# Patient Record
Sex: FEMALE | Race: WHITE | NOT HISPANIC OR LATINO | Employment: OTHER | URBAN - METROPOLITAN AREA
[De-identification: names, ages, dates, MRNs, and addresses within clinical notes are randomized per-mention and may not be internally consistent; named-entity substitution may affect disease eponyms.]

---

## 2018-12-23 ENCOUNTER — HOSPITAL ENCOUNTER (EMERGENCY)
Facility: HOSPITAL | Age: 23
Discharge: HOME | End: 2018-12-23
Attending: EMERGENCY MEDICINE
Payer: MEDICAID

## 2018-12-23 VITALS
SYSTOLIC BLOOD PRESSURE: 121 MMHG | TEMPERATURE: 98 F | OXYGEN SATURATION: 97 % | RESPIRATION RATE: 16 BRPM | HEIGHT: 66 IN | WEIGHT: 186 LBS | DIASTOLIC BLOOD PRESSURE: 64 MMHG | BODY MASS INDEX: 29.89 KG/M2 | HEART RATE: 80 BPM

## 2018-12-23 DIAGNOSIS — J06.9 VIRAL URI: Primary | ICD-10-CM

## 2018-12-23 PROCEDURE — 99281 EMR DPT VST MAYX REQ PHY/QHP: CPT

## 2018-12-23 NOTE — ED PROVIDER NOTES
"Encounter Date: 12/23/2018    SCRIBE #1 NOTE: Ariella EM am scribing for, and in the presence of, JACOB Franks.       History     Chief Complaint   Patient presents with    Cough    Nasal Congestion       Time seen by provider: 10:05 AM on 12/23/2018    Bowen Edwards is a 23 y.o. female with no pertinent PMHx or SHx on file who presents to the ED with complaints of a runny nose that started "a couple days ago". Patient denies cough, sore throat, ear pain, sinus pain, body aches, N/V/D, abdominal pain, fever, and chills. She reports her son is sick with similar symptoms. The patient has no other medical concerns or complaints at this moment. She denies onset of any other new symptoms currently. Penicillin allergy noted.       The history is provided by the patient.     Review of patient's allergies indicates:   Allergen Reactions    Pcn [penicillins]      History reviewed. No pertinent past medical history.  History reviewed. No pertinent surgical history.  History reviewed. No pertinent family history.  Social History     Tobacco Use    Smoking status: Never Smoker   Substance Use Topics    Alcohol use: Not on file    Drug use: Not on file     Review of Systems   Constitutional: Negative for activity change, chills and fever.   HENT: Positive for rhinorrhea. Negative for congestion, ear pain and sore throat.    Respiratory: Negative for cough and wheezing.    Cardiovascular: Negative for chest pain.   Gastrointestinal: Negative for diarrhea, nausea and vomiting.   Musculoskeletal: Negative for myalgias and neck pain.   Skin: Negative for rash.   Neurological: Negative for syncope and headaches.   Hematological: Negative for adenopathy.   Psychiatric/Behavioral: The patient is not nervous/anxious.        Physical Exam     Initial Vitals [12/23/18 0853]   BP Pulse Resp Temp SpO2   121/64 80 16 98.3 °F (36.8 °C) 97 %      MAP       --         Physical Exam    Nursing note and vitals " reviewed.  Constitutional: She appears well-developed and well-nourished. She is not diaphoretic.  Non-toxic appearance. She does not have a sickly appearance. She does not appear ill. No distress.   HENT:   Head: Normocephalic and atraumatic.   Right Ear: Hearing, tympanic membrane, external ear and ear canal normal.   Left Ear: Hearing, tympanic membrane, external ear and ear canal normal.   Nose: Nose normal. No rhinorrhea. Right sinus exhibits no frontal sinus tenderness. Left sinus exhibits no frontal sinus tenderness.   Mouth/Throat: Uvula is midline, oropharynx is clear and moist and mucous membranes are normal. No oropharyngeal exudate.   Eyes: Conjunctivae and EOM are normal. Right eye exhibits no discharge. Left eye exhibits no discharge.   Neck: Normal range of motion. Neck supple.   Cardiovascular: Normal rate, regular rhythm and normal heart sounds. Exam reveals no gallop and no friction rub.    No murmur heard.  Pulmonary/Chest: Breath sounds normal. No respiratory distress. She has no wheezes. She has no rhonchi. She has no rales.   Abdominal: Soft. Bowel sounds are normal. There is no tenderness.   Musculoskeletal: Normal range of motion.   Lymphadenopathy:     She has no cervical adenopathy.   Neurological: She is alert and oriented to person, place, and time. GCS score is 15. GCS eye subscore is 4. GCS verbal subscore is 5. GCS motor subscore is 6.   Skin: Skin is warm and dry. No rash noted.   Psychiatric: She has a normal mood and affect. Her behavior is normal. Judgment and thought content normal.         ED Course   Procedures  Labs Reviewed - No data to display       Imaging Results    None          Medical Decision Making:   History:   Old Medical Records: I decided to obtain old medical records.  Differential Diagnosis:   Influenza  Pneumonia  Strep pharyngitis  Meningitis  Viral syndrome       APC / Resident Notes:   23 year old well appearing, otherwise healthy female presents with c/o  runny nose. The patient appears to have a viral upper respiratory infection.  Based upon the history and physical exam the patient does not appear to have a serious bacterial infection such as pneumonia, sepsis, otitis media, bacterial sinusitis, strep pharyngitis, parapharyngeal or peritonsillar abscess, meningitis.  Patient appears very well and I have given specific return precautions to the patient.  The patient can take over the counter medications and does not appear to need antibiotics at this time. I have discussed pt with Dr Vargas who agrees with POC. Pt voices understanding and is agreeable to the plan.  She is given specific return precautions.        Scribe Attestation:   Scribe #1: I performed the above scribed service and the documentation accurately describes the services I performed. I attest to the accuracy of the note.      I, CAROLINE Franks, personally performed the services described in this documentation. All medical record entries made by the scribe were at my direction and in my presence.  I have reviewed the chart and agree that the record reflects my personal performance and is accurate and complete. CAROLINE Franks.  11:34 AM 12/23/2018             Clinical Impression:   The encounter diagnosis was Viral URI.      Disposition:   Disposition: Discharged  Condition: Stable                        Jaye Damon NP  12/23/18 6115

## 2019-07-20 ENCOUNTER — HOSPITAL ENCOUNTER (EMERGENCY)
Facility: HOSPITAL | Age: 24
Discharge: HOME | End: 2019-07-20
Attending: EMERGENCY MEDICINE
Payer: MEDICAID

## 2019-07-20 VITALS
DIASTOLIC BLOOD PRESSURE: 62 MMHG | HEART RATE: 91 BPM | HEIGHT: 66 IN | WEIGHT: 168.88 LBS | OXYGEN SATURATION: 100 % | RESPIRATION RATE: 20 BRPM | BODY MASS INDEX: 27.14 KG/M2 | SYSTOLIC BLOOD PRESSURE: 116 MMHG | TEMPERATURE: 98 F

## 2019-07-20 DIAGNOSIS — Z32.01 POSITIVE URINE PREGNANCY TEST: Primary | ICD-10-CM

## 2019-07-20 LAB
B-HCG UR QL: POSITIVE
CTP QC/QA: YES

## 2019-07-20 PROCEDURE — 81025 URINE PREGNANCY TEST: CPT | Performed by: NURSE PRACTITIONER

## 2019-07-20 PROCEDURE — 99282 EMERGENCY DEPT VISIT SF MDM: CPT | Mod: 25

## 2019-07-20 NOTE — ED PROVIDER NOTES
Encounter Date: 2019    SCRIBE #1 NOTE: Ariella EM am scribing for, and in the presence of, CAROLINE Franks.       History     Chief Complaint   Patient presents with    Possible Pregnancy     reports positive test last tuesday. wants to be referred to an OBGYN       Time seen by provider: 12:26 PM on 2019    Bowen Edwards is a 23 y.o. female with no PMHx or SHx on file who presents to the ED for evaluation of an at-home pregnancy test. The patient requests referral to a local OB/GYN in [place]. The patient's  is 2 and Para is 1. The patient's previous OB/GYN was in [place]. The patient denies onset of abdominal pain or vaginal symptoms. Denies urinary symptoms. The first day of her last normal menstrual cycle was x2 months ago (). The patient denies complications of previous pregnancy. The patient has no other medical concerns or complaints at this moment.     The history is provided by the patient.     Review of patient's allergies indicates:   Allergen Reactions    Pcn [penicillins]      History reviewed. No pertinent past medical history.  History reviewed. No pertinent surgical history.  History reviewed. No pertinent family history.  Social History     Tobacco Use    Smoking status: Never Smoker   Substance Use Topics    Alcohol use: Not on file    Drug use: Not on file     Review of Systems   Constitutional: Negative for chills and fever.   Respiratory: Negative for shortness of breath.    Cardiovascular: Negative for chest pain.   Gastrointestinal: Negative for abdominal pain, nausea and vomiting.   Genitourinary: Negative for difficulty urinating, dysuria, hematuria, vaginal bleeding and vaginal discharge.   Musculoskeletal: Negative for back pain and gait problem.   Skin: Negative for pallor and rash.   Neurological: Negative for weakness and numbness.   Hematological: Does not bruise/bleed easily.   Psychiatric/Behavioral: The patient is not nervous/anxious.         Physical Exam     Initial Vitals [07/20/19 1217]   BP Pulse Resp Temp SpO2   116/62 91 20 98 °F (36.7 °C) 100 %      MAP       --         Physical Exam    Nursing note and vitals reviewed.  Constitutional: She appears well-developed and well-nourished. She is not diaphoretic. She is active. No distress.   HENT:   Head: Normocephalic and atraumatic.   Right Ear: External ear normal.   Left Ear: External ear normal.   Nose: Nose normal.   Mouth/Throat: Oropharynx is clear and moist. No oropharyngeal exudate.   Eyes: Conjunctivae, EOM and lids are normal. Pupils are equal, round, and reactive to light. Right eye exhibits no chemosis and no discharge. Left eye exhibits no chemosis and no discharge. Right conjunctiva is not injected. Left conjunctiva is not injected.   Neck: Trachea normal and normal range of motion. Neck supple. No stridor present. No tracheal deviation present. No neck rigidity.   Cardiovascular: Normal rate, regular rhythm, normal heart sounds and normal pulses. Exam reveals no distant heart sounds and no friction rub.    No murmur heard.  Pulmonary/Chest: Breath sounds normal. No stridor. She has no wheezes. She has no rhonchi. She has no rales.   Abdominal: Soft. She exhibits no distension. There is no tenderness. There is no rebound and no guarding.   Abdomen soft and non distended without TTP, rebound or guarding.    Musculoskeletal: Normal range of motion.   Neurological: She is alert and oriented to person, place, and time. She has normal strength.   Skin: Skin is warm, dry and intact. Capillary refill takes less than 2 seconds. No rash noted.   Psychiatric: She has a normal mood and affect. Her speech is normal and behavior is normal. Thought content normal.         ED Course   Procedures  Labs Reviewed   POCT URINE PREGNANCY - Abnormal; Notable for the following components:       Result Value    POC Preg Test, Ur Positive (*)     All other components within normal limits          Imaging  Results    None          Medical Decision Making:   History:   Old Medical Records: I decided to obtain old medical records.  Differential Diagnosis:   Intrauterine pregnancy   Miscarriage  Ectopic pregnancy   Clinical Tests:   Lab Tests: Reviewed and Ordered       APC / Resident Notes:   23 year old female presents today after having positive home upt several days ago. She denies complaints at this time including abd pain, cramping, vaginal bleeding/discharge, urinary symptoms or n/v. Pt is only requesting a referral to a local ob/gyn. UPT in ED positive. I do not suspect threatened miscarriage, ectopic pregnancy or other emergent condition and do not feel further evaluation or treatment is needed and pt is stable for discharge. Name and number of local ob/gyn providers given. I have discussed pt with Dr Ramirez who agrees with poc. Pt voices understanding and is agreeable to the plan.  She is given specific return precautions.          Scribe Attestation:   Scribe #1: I performed the above scribed service and the documentation accurately describes the services I performed. I attest to the accuracy of the note.      I, CAROLINE Franks, personally performed the services described in this documentation. All medical record entries made by the scribe were at my direction and in my presence.  I have reviewed the chart and agree that the record reflects my personal performance and is accurate and complete. CAROLINE Franks.  2:40 PM 07/20/2019               Clinical Impression:       ICD-10-CM ICD-9-CM   1. Positive urine pregnancy test Z32.01 V72.42         Disposition:   Disposition: Discharged  Condition: Stable                        Jaye Damon NP  07/20/19 1440

## 2019-08-14 ENCOUNTER — HOSPITAL ENCOUNTER (EMERGENCY)
Facility: HOSPITAL | Age: 24
Discharge: HOME | End: 2019-08-15
Attending: EMERGENCY MEDICINE
Payer: MEDICAID

## 2019-08-14 DIAGNOSIS — O20.0 THREATENED MISCARRIAGE: Primary | ICD-10-CM

## 2019-08-14 LAB
ALBUMIN SERPL BCP-MCNC: 4.2 G/DL (ref 3.5–5.2)
ALP SERPL-CCNC: 40 U/L (ref 55–135)
ALT SERPL W/O P-5'-P-CCNC: 14 U/L (ref 10–44)
ANION GAP SERPL CALC-SCNC: 8 MMOL/L (ref 8–16)
AST SERPL-CCNC: 11 U/L (ref 10–40)
BASOPHILS # BLD AUTO: 0.02 K/UL (ref 0–0.2)
BASOPHILS NFR BLD: 0.2 % (ref 0–1.9)
BILIRUB SERPL-MCNC: 0.4 MG/DL (ref 0.1–1)
BUN SERPL-MCNC: 15 MG/DL (ref 6–20)
CALCIUM SERPL-MCNC: 8.9 MG/DL (ref 8.7–10.5)
CHLORIDE SERPL-SCNC: 105 MMOL/L (ref 95–110)
CO2 SERPL-SCNC: 23 MMOL/L (ref 23–29)
CREAT SERPL-MCNC: 0.8 MG/DL (ref 0.5–1.4)
DIFFERENTIAL METHOD: ABNORMAL
EOSINOPHIL # BLD AUTO: 0.1 K/UL (ref 0–0.5)
EOSINOPHIL NFR BLD: 0.7 % (ref 0–8)
ERYTHROCYTE [DISTWIDTH] IN BLOOD BY AUTOMATED COUNT: 12.1 % (ref 11.5–14.5)
EST. GFR  (AFRICAN AMERICAN): >60 ML/MIN/1.73 M^2
EST. GFR  (NON AFRICAN AMERICAN): >60 ML/MIN/1.73 M^2
GLUCOSE SERPL-MCNC: 87 MG/DL (ref 70–110)
HCG INTACT+B SERPL-ACNC: NORMAL MIU/ML
HCT VFR BLD AUTO: 38.9 % (ref 37–48.5)
HGB BLD-MCNC: 12.8 G/DL (ref 12–16)
IMM GRANULOCYTES # BLD AUTO: 0.05 K/UL (ref 0–0.04)
IMM GRANULOCYTES NFR BLD AUTO: 0.4 % (ref 0–0.5)
LYMPHOCYTES # BLD AUTO: 1.6 K/UL (ref 1–4.8)
LYMPHOCYTES NFR BLD: 14.2 % (ref 18–48)
MCH RBC QN AUTO: 30.5 PG (ref 27–31)
MCHC RBC AUTO-ENTMCNC: 32.9 G/DL (ref 32–36)
MCV RBC AUTO: 93 FL (ref 82–98)
MONOCYTES # BLD AUTO: 0.5 K/UL (ref 0.3–1)
MONOCYTES NFR BLD: 4.7 % (ref 4–15)
NEUTROPHILS # BLD AUTO: 9.1 K/UL (ref 1.8–7.7)
NEUTROPHILS NFR BLD: 79.8 % (ref 38–73)
NRBC BLD-RTO: 0 /100 WBC
PLATELET # BLD AUTO: 279 K/UL (ref 150–350)
PMV BLD AUTO: 9.7 FL (ref 9.2–12.9)
POTASSIUM SERPL-SCNC: 3.5 MMOL/L (ref 3.5–5.1)
PROT SERPL-MCNC: 7.7 G/DL (ref 6–8.4)
RBC # BLD AUTO: 4.2 M/UL (ref 4–5.4)
RH BLD: NORMAL
SODIUM SERPL-SCNC: 136 MMOL/L (ref 136–145)
WBC # BLD AUTO: 11.36 K/UL (ref 3.9–12.7)

## 2019-08-14 PROCEDURE — 86901 BLOOD TYPING SEROLOGIC RH(D): CPT

## 2019-08-14 PROCEDURE — 85025 COMPLETE CBC W/AUTO DIFF WBC: CPT

## 2019-08-14 PROCEDURE — 84702 CHORIONIC GONADOTROPIN TEST: CPT

## 2019-08-14 PROCEDURE — 99285 EMERGENCY DEPT VISIT HI MDM: CPT | Mod: 25

## 2019-08-14 PROCEDURE — 80053 COMPREHEN METABOLIC PANEL: CPT

## 2019-08-15 VITALS
HEIGHT: 66 IN | WEIGHT: 167 LBS | DIASTOLIC BLOOD PRESSURE: 62 MMHG | HEART RATE: 91 BPM | SYSTOLIC BLOOD PRESSURE: 130 MMHG | OXYGEN SATURATION: 100 % | TEMPERATURE: 99 F | BODY MASS INDEX: 26.84 KG/M2 | RESPIRATION RATE: 18 BRPM

## 2019-08-15 LAB
BACTERIA GENITAL QL WET PREP: ABNORMAL
BILIRUB UR QL STRIP: NEGATIVE
CLARITY UR: CLEAR
CLUE CELLS VAG QL WET PREP: ABNORMAL
COLOR UR: YELLOW
FILAMENT FUNGI VAG WET PREP-#/AREA: ABNORMAL
GLUCOSE UR QL STRIP: NEGATIVE
HGB UR QL STRIP: NEGATIVE
KETONES UR QL STRIP: NEGATIVE
LEUKOCYTE ESTERASE UR QL STRIP: NEGATIVE
NITRITE UR QL STRIP: NEGATIVE
PH UR STRIP: 6 [PH] (ref 5–8)
PROT UR QL STRIP: NEGATIVE
SP GR UR STRIP: 1.02 (ref 1–1.03)
SPECIMEN SOURCE: ABNORMAL
T VAGINALIS GENITAL QL WET PREP: ABNORMAL
URN SPEC COLLECT METH UR: NORMAL
UROBILINOGEN UR STRIP-ACNC: NEGATIVE EU/DL
WBC #/AREA VAG WET PREP: ABNORMAL
YEAST GENITAL QL WET PREP: ABNORMAL

## 2019-08-15 PROCEDURE — 87210 SMEAR WET MOUNT SALINE/INK: CPT

## 2019-08-15 PROCEDURE — 87491 CHLMYD TRACH DNA AMP PROBE: CPT

## 2019-08-15 PROCEDURE — 87205 SMEAR GRAM STAIN: CPT

## 2019-08-15 PROCEDURE — 87081 CULTURE SCREEN ONLY: CPT

## 2019-08-15 PROCEDURE — 81003 URINALYSIS AUTO W/O SCOPE: CPT

## 2019-08-15 NOTE — ED NOTES
Patient identifiers for Bowen Edwards checked and correct.  LOC:  Bowen Edwards is awake, alert, and aware of environment with an appropriate affect. She is oriented x 3 and speaking appropriately.  APPEARANCE:  She is resting comfortably and in no acute distress. She is clean and well groomed, patient's clothing is properly fastened.  SKIN:  The skin is warm and dry. She has normal skin turgor and moist mucus membranes. Skin is intact; no bruising or breakdown noted.  MUSCULOSKELETAL:  She is moving all extremities well, no obvious deformities noted. Pulses intact.   RESPIRATORY:  Airway is open and patent. Respirations are spontaneous and non-labored with normal effort and rate.  CARDIAC:  She has a normal rate and rhythm. No peripheral edema noted. Capillary refill < 3 seconds.  ABDOMEN:  No distention noted.  Soft and non-tender upon palpation.  NEUROLOGICAL:  PERRL. Facial expression is symmetrical. Hand grasps are equal bilaterally. Normal sensation in all extremities when touched with finger.  Allergies reported:    Review of patient's allergies indicates:   Allergen Reactions    Pcn [penicillins]      OTHER NOTES:

## 2019-08-18 LAB
CHLAMYDIA, AMPLIFIED DNA: POSITIVE
N GONORRHOEAE, AMPLIFIED DNA: NEGATIVE

## 2019-08-19 LAB
BACTERIA GENITAL AEROBE CULT: NORMAL
GRAM STN SPEC: NORMAL

## 2019-08-19 NOTE — ED PROVIDER NOTES
Encounter Date: 8/14/2019   Chart was completed following encounter on dated signed.    Patient call back -- mom provided cell phone number - [phone] -contacted  Patient she will return to ED for treatment.    4:18 PM 8/27/19  Mazin Robertson       History     Chief Complaint   Patient presents with    Vaginal Bleeding     HPI     Seen and evaluated.  Presenting with a chief complaint of vaginal spotting.  Also 8 weeks pregnant.  This began acutely has been episodic in mild to moderate.  Denies any associated nausea vomiting or diarrhea.  No associated fever or chills.  Symptoms moderate persistent and ongoing.  No alleviating or exacerbating factors noted.    Review of patient's allergies indicates:   Allergen Reactions    Pcn [penicillins]      No past medical history on file.  No past surgical history on file.  No family history on file.  Social History     Tobacco Use    Smoking status: Never Smoker   Substance Use Topics    Alcohol use: Not on file    Drug use: Not on file     Review of Systems   Constitutional: Negative for fever.   HENT: Negative for sore throat.    Respiratory: Negative for shortness of breath.    Cardiovascular: Negative for chest pain.   Gastrointestinal: Negative for nausea.   Genitourinary: Positive for vaginal bleeding. Negative for dysuria.   Musculoskeletal: Negative for back pain.   Skin: Negative for rash.   Neurological: Negative for weakness.   Hematological: Does not bruise/bleed easily.   All other systems reviewed and are negative.      Physical Exam     Initial Vitals [08/14/19 2219]   BP Pulse Resp Temp SpO2   121/79 96 18 98.8 °F (37.1 °C) 99 %      MAP       --         Physical Exam    Nursing note and vitals reviewed.  Constitutional: Vital signs are normal. She appears well-developed and well-nourished.   HENT:   Head: Normocephalic and atraumatic.   Eyes: Conjunctivae are normal.   Neck: Neck supple.   Cardiovascular: Normal rate and regular rhythm.    Pulmonary/Chest: No respiratory distress.   Abdominal: Soft. Normal appearance.   Genitourinary: Vaginal discharge found.   Genitourinary Comments: No significant vaginal bleeding noted    Musculoskeletal: Normal range of motion.   Neurological: She is alert and oriented to person, place, and time.   Skin: Skin is warm and dry.   Psychiatric: She has a normal mood and affect.         ED Course   Procedures  Labs Reviewed   C. TRACHOMATIS/N. GONORRHOEAE BY AMP DNA - Abnormal; Notable for the following components:       Result Value    Chlamydia, Amplified DNA Positive (*)     All other components within normal limits    Narrative:     Sources by Resulting Lab:->[facility]   CBC W/ AUTO DIFFERENTIAL - Abnormal; Notable for the following components:    Gran # (ANC) 9.1 (*)     Immature Grans (Abs) 0.05 (*)     Gran% 79.8 (*)     Lymph% 14.2 (*)     All other components within normal limits   COMPREHENSIVE METABOLIC PANEL - Abnormal; Notable for the following components:    Alkaline Phosphatase 40 (*)     All other components within normal limits   VAGINAL SCREEN - Abnormal; Notable for the following components:    WBC - Vaginal Screen Rare (*)     All other components within normal limits    Narrative:     Specimen Source->Vagina   CULTURE, GONOCOCCUS   URINALYSIS    Narrative:     Collection Type->Urine, Clean Catch   HCG, QUANTITATIVE, PREGNANCY   RH TYPING          Imaging Results          US OB Less Than 14 Wks with Transvaginal (xpd) (Final result)  Result time 08/15/19 00:19:39   Procedure changed from US OB Transvaginal     Final result by Justyn Locke MD (08/15/19 00:19:39)                 Narrative:        Exam: OB ULTRASOUND < 14 WEEKS      Clinical data: Vaginal spotting today, denies abdominal pain.      Technique: Real-time grayscale imaging of the abdomen and pelvis for fetus was performed.    Prior studies: No prior studies submitted.    Findings: A single live intrauterine gestation with a crown-rump  length of 2.1 cm consistent with 8 weeks 5 days. Gestational sac measures 3.7 cm consistent with 8 weeks 6 days. Normal implantation. Yolk sac is visualized. Fetal heart rate measured at 173 beats per minute.      The uterus measures 9.9 x 6.8 x 7.3 cm.    The right ovary is enlarged in size measuring 7.8 x 6.2 x 7.3 cm, large cystic area with possible septation.    The left ovary measures 2.9 x 2.8 x 1.6 cm. Arterial and venous flow noted bilateral ovaries.    EGA by LMP 14 weeks.    EGA by US 8 weeks 6 days. STACIE - 3/19/2020.      IMPRESSION:      1. A single live intrauterine gestation consistent with 8 weeks 5 days.      2. Large right ovarian cyst.      Recommendation:    Follow up as clinically indicated.        Read by:        Justyn Locke MD  Transcribed by: Sherri Schultz  Transcribed Date: 8/14/2019 11:58:59 PM  Electronically signed by: Justyn Locke MD  Date signed: 8/15/2019 0:15:15 AM                                 Medical Decision Making:   Initial Assessment:   Patient was seen and evaluated.  She presented with a chief complaint of vaginal spotting while pregnant.  Laboratory evaluation and ultrasound exam was stable. Physical exam showed mild discharge. Cultures sent.  Patient otherwise asymptomatic.  Rh was positive.  Patient has intrauterine pregnancy of approximately 8 weeks.  Recommended close OB follow-up.  ED Management:  Will contact patient regarding positive Chlamydia test.                          Clinical Impression:       ICD-10-CM ICD-9-CM   1. Threatened miscarriage O20.0 640.00                                Mazin Robertson Jr., MD  08/19/19 6561       Mazin Robertson Jr., MD  08/27/19 9777

## 2020-01-26 ENCOUNTER — HOSPITAL ENCOUNTER (EMERGENCY)
Facility: HOSPITAL | Age: 25
Discharge: HOME | End: 2020-01-26
Attending: EMERGENCY MEDICINE
Payer: MEDICAID

## 2020-01-26 VITALS
HEART RATE: 94 BPM | BODY MASS INDEX: 31.34 KG/M2 | SYSTOLIC BLOOD PRESSURE: 101 MMHG | OXYGEN SATURATION: 100 % | TEMPERATURE: 98 F | DIASTOLIC BLOOD PRESSURE: 59 MMHG | WEIGHT: 195 LBS | RESPIRATION RATE: 16 BRPM | HEIGHT: 66 IN

## 2020-01-26 DIAGNOSIS — S80.862A INSECT BITE OF LEFT LOWER LEG, INITIAL ENCOUNTER: Primary | ICD-10-CM

## 2020-01-26 DIAGNOSIS — L03.116 CELLULITIS OF LEFT LOWER EXTREMITY: ICD-10-CM
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
## 2020-01-26 PROCEDURE — 99282 EMERGENCY DEPT VISIT SF MDM: CPT

## 2020-01-26 RX ORDER — CLINDAMYCIN HYDROCHLORIDE 300 MG/1
300 CAPSULE ORAL 3 TIMES DAILY
Qty: 21 CAPSULE | Refills: 0 | Status: SHIPPED | OUTPATIENT
Start: 2020-01-26 | End: 2020-02-02

## 2020-01-26 RX ORDER — MUPIROCIN 20 MG/G
OINTMENT TOPICAL 3 TIMES DAILY
Qty: 30 G | Refills: 0 | Status: SHIPPED | OUTPATIENT
Start: 2020-01-26 | End: 2022-12-15

## 2020-01-26 NOTE — ED PROVIDER NOTES
Encounter Date: 1/26/2020       History     Chief Complaint   Patient presents with    Insect Bite     L LOW LEG X 2 MOS     24-year-old female presents to the emergency department for evaluation of a wound to her left lower extremity.  The patient states that she has had this wound for approximately 1 month.  Insidious in onset, reportedly initially as a spider bite.  Treated at urgent care initially however the symptoms have waxed and waned for the last month.  The patient states she now has several areas of new tenderness and swelling around the original lesion which is also did painful.        Review of patient's allergies indicates:   Allergen Reactions    Penicillins Anaphylaxis and Swelling     History reviewed. No pertinent past medical history.  No past surgical history on file.  No family history on file.  Social History     Tobacco Use    Smoking status: Never Smoker   Substance Use Topics    Alcohol use: Not on file    Drug use: Not on file     Review of Systems   Constitutional: Negative for chills and fever.   HENT: Negative for congestion, rhinorrhea and sore throat.    Eyes: Negative for discharge and redness.   Respiratory: Negative for cough and shortness of breath.    Cardiovascular: Negative for chest pain.   Gastrointestinal: Negative for abdominal pain.   Musculoskeletal: Negative for arthralgias, back pain and joint swelling.   Skin: Positive for wound. Negative for rash.   Neurological: Negative for weakness.   Psychiatric/Behavioral: The patient is not nervous/anxious.    All other systems reviewed and are negative.      Physical Exam     Initial Vitals [01/26/20 1409]   BP Pulse Resp Temp SpO2   (!) 101/59 94 16 98.4 °F (36.9 °C) 100 %      MAP       --         Physical Exam    Nursing note and vitals reviewed.  Constitutional: She appears well-developed and well-nourished.   HENT:   Head: Normocephalic and atraumatic.   Eyes: EOM are normal. Pupils are equal, round, and reactive to  light.   Neck: Normal range of motion.   Pulmonary/Chest: No respiratory distress. She exhibits no deformity.   Musculoskeletal: Normal range of motion.        Legs:  Neurological: She is alert and oriented to person, place, and time.   Skin: Skin is warm and dry. Lesion and rash noted. No abscess noted. Rash is maculopapular. There is erythema.   The patient has 4 lesions noted to the left lateral aspect of the lower leg.  The larger central lesion shows no evidence of pointing or fluctuance but there is some annular scaling of the skin consistent with recent swelling which has improved.  There are now 3 erythematous satellite lesions none of which are pointing or fluctuance either around the reported original central lesion.   Psychiatric: She has a normal mood and affect. Her behavior is normal.         ED Course   Procedures  Labs Reviewed - No data to display       Imaging Results    None          Medical Decision Making:   Initial Assessment:   NAD  Differential Diagnosis:   The patient's differential diagnoses includes but is not limited to insect bite, spider bite, skin soft tissue infection, MRSA  ED Management:  24-year-old female who presents emergency department with redness and swelling around the left lateral aspect of the lower leg.  I suspect possible early developing abscess but there is certainly some cellulitis.  Will recommend topical treatment with Bactroban, patient is pregnant will place on clindamycin for MRSA coverage.                                 Clinical Impression:       ICD-10-CM ICD-9-CM   1. Insect bite of left lower leg, initial encounter S80.862A 916.4    W57.XXXA E906.4   2. Cellulitis of left lower extremity L03.116 682.6                             MAHAD Mahoney  01/26/20 8278

## 2022-11-28 ENCOUNTER — TELEPHONE (OUTPATIENT)
Dept: OBSTETRICS AND GYNECOLOGY | Facility: CLINIC | Age: 27
End: 2022-11-28
Payer: MEDICAID

## 2022-11-28 NOTE — TELEPHONE ENCOUNTER
----- Message from Earlene Ramirez sent at 11/28/2022  3:57 PM CST -----  Contact: Pt at [phone]  Type:  Sooner Appointment Request    Caller is requesting a sooner appointment.  Caller declined first available appointment listed below.  Caller will not accept being placed on the waitlist and is requesting a message be sent to doctor.    Name of Caller:  Pt   When is the first available appointment?  N/A  Symptoms:  5 months pregnant  Best Call Back Number:  [phone]  Additional Information:  Pt is calling office to schedule OB appt. Pt's Medicaid is pending, but needs to be seen ASAP. Please call and advise.

## 2022-12-15 ENCOUNTER — INITIAL PRENATAL (OUTPATIENT)
Dept: OBSTETRICS AND GYNECOLOGY | Facility: CLINIC | Age: 27
End: 2022-12-15

## 2022-12-15 VITALS
HEART RATE: 90 BPM | DIASTOLIC BLOOD PRESSURE: 74 MMHG | BODY MASS INDEX: 32.22 KG/M2 | SYSTOLIC BLOOD PRESSURE: 120 MMHG | WEIGHT: 199.63 LBS

## 2022-12-15 DIAGNOSIS — O09.32 LIMITED PRENATAL CARE IN SECOND TRIMESTER: Primary | ICD-10-CM

## 2022-12-15 PROCEDURE — 99204 OFFICE O/P NEW MOD 45 MIN: CPT | Mod: S$GLB,,, | Performed by: STUDENT IN AN ORGANIZED HEALTH CARE EDUCATION/TRAINING PROGRAM

## 2022-12-15 PROCEDURE — 99204 PR OFFICE/OUTPT VISIT, NEW, LEVL IV, 45-59 MIN: ICD-10-PCS | Mod: S$GLB,,, | Performed by: STUDENT IN AN ORGANIZED HEALTH CARE EDUCATION/TRAINING PROGRAM

## 2022-12-15 RX ORDER — SWAB
1 SWAB, NON-MEDICATED MISCELLANEOUS DAILY
Qty: 30 TABLET | Refills: 9 | Status: SHIPPED | OUTPATIENT
Start: 2022-12-15 | End: 2023-10-11

## 2022-12-15 NOTE — PROGRESS NOTES
Bowen Izquierdo is a 27 y.o. [id number] at 20w2d who presents for initial OB visit. LMP 2022 (was breastfeeding). Patient was initially seen for an OB visit ([facility]) in the first trimester. She states that she had genetic screening done (low risk, female) and a dating ultrasound ([facility]ACIE 2023). Patient states that she then lost her insurance and now has Medicaid. This is her second prenatal visit this pregnancy. She has been taking prenatal vitamins inconsistently. She denies any PMHx or PSurgHx. She has a history of three uncomplicated SVDs. She moved to [place] from [place] one year ago (originally from ).    PMHx: none  Meds: none  PSurgHx: none  Last Pap: , NILM  History of GC/CT, HSV: denies    OBHx:  2017 - , IOL at 42w - BO[facility] ([facility])   - , BOY ([facility])   - , BOY ([place])    /74   Pulse 90   Wt 90.5 kg (199 lb 9.6 oz)   LMP 2022   BMI 32.22 kg/m²     27 y.o., at 20w2d by Estimated Date of Delivery: 23  There is no problem list on file for this patient.    OB History    Para Term  AB Living   4 3 3   0 3   SAB IAB Ectopic Multiple Live Births   0 0 0 0 3      # Outcome Date GA Lbr Tyrel/2nd Weight Sex Delivery Anes PTL Lv   4 Current            3 Term     M    CHELSI   2 Term     M    CHELSI   1 Term     M    CHELSI       Dating reviewed    Allergies and problem list reviewed and updated    Medical and surgical history reviewed    Prenatal labs reviewed and updated    Physical Exam:  ABD: soft, gravid, nontender    Assessment/Plan:   1. Limited prenatal care in second trimester  - Has not had flu shot and declines  - Anatomy ultrasound ordered  - Records request form signed and faxed  - Prenatal labs ordered  - Rx for PNV sent to pharmacy  - Labor precautions reviewed    - US OB/GYN Procedure ([facility])-Future; Future  - Hepatitis C Antibody; Future  - HIV 1/2 Ag/Ab (4th Gen); Future  - RPR; Future  - Hepatitis B surface antigen; Future  - Type &  Screen; Future  - Rubella antibody, IgG; Future  - Urine culture  - CBC auto differential; Future  - Basic metabolic panel; Future  - C. trachomatis/N. gonorrhoeae by AMP DNA [facility]; Urine  - Hemoglobin Electrophoresis,Hgb A2 [place].; Future    Return to clinic in 4 weeks for VALERIY visit.     Juany Elam MD  OB/GYN

## 2023-01-10 ENCOUNTER — PATIENT MESSAGE (OUTPATIENT)
Dept: OTHER | Facility: OTHER | Age: 28
End: 2023-01-10
Payer: MEDICAID

## 2023-01-24 ENCOUNTER — PATIENT MESSAGE (OUTPATIENT)
Dept: OTHER | Facility: OTHER | Age: 28
End: 2023-01-24
Payer: MEDICAID

## 2023-02-07 ENCOUNTER — PATIENT MESSAGE (OUTPATIENT)
Dept: OTHER | Facility: OTHER | Age: 28
End: 2023-02-07
Payer: MEDICAID

## 2023-02-21 ENCOUNTER — PATIENT MESSAGE (OUTPATIENT)
Dept: OTHER | Facility: OTHER | Age: 28
End: 2023-02-21
Payer: MEDICAID

## 2023-03-07 ENCOUNTER — PATIENT MESSAGE (OUTPATIENT)
Dept: OTHER | Facility: OTHER | Age: 28
End: 2023-03-07
Payer: MEDICAID

## 2023-03-28 ENCOUNTER — PATIENT MESSAGE (OUTPATIENT)
Dept: OTHER | Facility: OTHER | Age: 28
End: 2023-03-28
Payer: MEDICAID

## 2023-06-27 ENCOUNTER — PATIENT MESSAGE (OUTPATIENT)
Dept: RESEARCH | Facility: HOSPITAL | Age: 28
End: 2023-06-27
Payer: MEDICAID

## 2023-10-03 ENCOUNTER — NURSE TRIAGE (OUTPATIENT)
Dept: OTHER | Facility: CLINIC | Age: 28
End: 2023-10-03

## 2023-10-03 NOTE — TELEPHONE ENCOUNTER
[facility]    Received call from [facility] with [employer]. Subjective: Caller states \"my right breast are painful, red and inflamed. I am breastfeeding also \"     Current Symptoms: breast pain, redness, swelling    Onset: 1 day ago; worsening    Associated Symptoms: body aches yesterday, sore throat yesterday     Pain Severity: 8/10; tender, painful to touch; Temperature:  denies    What has been tried: soak in epsom salt  Tylenol severe cold, chente    LMP: NA Pregnant: NA    Recommended disposition: See in Office Today    Care advice provided, patient verbalizes understanding; denies any other questions or concerns; instructed to call back for any new or worsening symptoms. Patient/Caller agrees with recommended disposition; writer provided warm transfer to [facility] [facility] [employer] [facility] for appointment scheduling    Attention Provider: Thank you for allowing me to participate in the care of your patient. The patient was connected to triage in response to information provided to the ECC/PSC. Please do not respond through this encounter as the response is not directed to a shared pool.       Reason for Disposition   Breast looks infected (spreading redness, feels hot or painful to touch) and no fever    Protocols used: Breast Symptoms-ADULT-[place]